# Patient Record
Sex: MALE | Race: BLACK OR AFRICAN AMERICAN | Employment: FULL TIME | URBAN - METROPOLITAN AREA
[De-identification: names, ages, dates, MRNs, and addresses within clinical notes are randomized per-mention and may not be internally consistent; named-entity substitution may affect disease eponyms.]

---
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
## 2021-12-29 ENCOUNTER — HOSPITAL ENCOUNTER (EMERGENCY)
Age: 21
Discharge: HOME | End: 2021-12-29
Attending: EMERGENCY MEDICINE

## 2021-12-29 ENCOUNTER — APPOINTMENT (OUTPATIENT)
Dept: GENERAL RADIOLOGY | Age: 21
End: 2021-12-29
Attending: EMERGENCY MEDICINE

## 2021-12-29 VITALS
TEMPERATURE: 97.7 F | SYSTOLIC BLOOD PRESSURE: 128 MMHG | RESPIRATION RATE: 18 BRPM | DIASTOLIC BLOOD PRESSURE: 78 MMHG | OXYGEN SATURATION: 98 % | HEART RATE: 56 BPM

## 2021-12-29 DIAGNOSIS — S63.289A DISLOCATION OF PROXIMAL INTERPHALANGEAL JOINT OF FINGER, INITIAL ENCOUNTER: Primary | ICD-10-CM

## 2021-12-29 PROCEDURE — 75810000301 HC ER LEVEL 1 CLOSED TREATMNT FRACTURE/DISLOCATION

## 2021-12-29 PROCEDURE — 99283 EMERGENCY DEPT VISIT LOW MDM: CPT

## 2021-12-29 PROCEDURE — 74011250637 HC RX REV CODE- 250/637: Performed by: EMERGENCY MEDICINE

## 2021-12-29 PROCEDURE — 73140 X-RAY EXAM OF FINGER(S): CPT

## 2021-12-29 PROCEDURE — 74011000250 HC RX REV CODE- 250: Performed by: EMERGENCY MEDICINE

## 2021-12-29 RX ORDER — HYDROCODONE BITARTRATE AND ACETAMINOPHEN 5; 325 MG/1; MG/1
1 TABLET ORAL ONCE
Status: COMPLETED | OUTPATIENT
Start: 2021-12-29 | End: 2021-12-29

## 2021-12-29 RX ORDER — IBUPROFEN 400 MG/1
800 TABLET ORAL
Status: COMPLETED | OUTPATIENT
Start: 2021-12-29 | End: 2021-12-29

## 2021-12-29 RX ORDER — LIDOCAINE HYDROCHLORIDE 20 MG/ML
10 INJECTION, SOLUTION INFILTRATION; PERINEURAL
Status: COMPLETED | OUTPATIENT
Start: 2021-12-29 | End: 2021-12-29

## 2021-12-29 RX ADMIN — HYDROCODONE BITARTRATE AND ACETAMINOPHEN 1 TABLET: 5; 325 TABLET ORAL at 19:06

## 2021-12-29 RX ADMIN — IBUPROFEN 800 MG: 400 TABLET, FILM COATED ORAL at 19:06

## 2021-12-29 RX ADMIN — LIDOCAINE HYDROCHLORIDE 200 MG: 20 INJECTION, SOLUTION INFILTRATION; PERINEURAL at 19:05

## 2021-12-29 NOTE — ED TRIAGE NOTES
Triage: Pt arrives ambulatory from home with CC of injury to his left pinky finger from playing basketball.

## 2021-12-29 NOTE — ED PROVIDER NOTES
The history is provided by the patient. Finger Pain   The current episode started less than 1 hour ago. The problem has not changed since onset. Pain location: left pinky. The pain is moderate. Associated symptoms include limited range of motion. The symptoms are aggravated by palpation and movement. He has tried rest for the symptoms. The treatment provided no relief. There has been a history of trauma (while playing basketball). No past medical history on file. No past surgical history on file. No family history on file. Social History     Socioeconomic History    Marital status: SINGLE     Spouse name: Not on file    Number of children: Not on file    Years of education: Not on file    Highest education level: Not on file   Occupational History    Not on file   Tobacco Use    Smoking status: Never Smoker    Smokeless tobacco: Never Used   Substance and Sexual Activity    Alcohol use: No    Drug use: No    Sexual activity: Never   Other Topics Concern    Not on file   Social History Narrative    Not on file     Social Determinants of Health     Financial Resource Strain:     Difficulty of Paying Living Expenses: Not on file   Food Insecurity:     Worried About Running Out of Food in the Last Year: Not on file    Chip of Food in the Last Year: Not on file   Transportation Needs:     Lack of Transportation (Medical): Not on file    Lack of Transportation (Non-Medical):  Not on file   Physical Activity:     Days of Exercise per Week: Not on file    Minutes of Exercise per Session: Not on file   Stress:     Feeling of Stress : Not on file   Social Connections:     Frequency of Communication with Friends and Family: Not on file    Frequency of Social Gatherings with Friends and Family: Not on file    Attends Yazidism Services: Not on file    Active Member of Clubs or Organizations: Not on file    Attends Club or Organization Meetings: Not on file    Marital Status: Not on file   Intimate Partner Violence:     Fear of Current or Ex-Partner: Not on file    Emotionally Abused: Not on file    Physically Abused: Not on file    Sexually Abused: Not on file   Housing Stability:     Unable to Pay for Housing in the Last Year: Not on file    Number of [place] in the Last Year: Not on file    Unstable Housing in the Last Year: Not on file         ALLERGIES: Patient has no known allergies. Review of Systems   Musculoskeletal: Positive for arthralgias and myalgias. All other systems reviewed and are negative. Vitals:    12/29/21 1741   BP: 138/79   Pulse: 89   Resp: 16   Temp: 98.3 °F (36.8 °C)   SpO2: 99%            Physical Exam  Vitals and nursing note reviewed. Constitutional:       Appearance: He is well-developed. HENT:      Head: Normocephalic and atraumatic. Eyes:      General: No scleral icterus. Cardiovascular:      Rate and Rhythm: Normal rate. Pulmonary:      Effort: Pulmonary effort is normal.   Abdominal:      General: There is no distension. Musculoskeletal:      Left hand: Deformity (left pinky) and tenderness present. Decreased range of motion. Normal capillary refill. Cervical back: Normal range of motion. Skin:     Findings: No erythema or rash. Neurological:      Mental Status: He is alert and oriented to person, place, and time. MDM       Reduction of Joint    Date/Time: 12/29/2021 7:28 PM  Performed by: Court Jc MD  Authorized by: Court Jc MD     Consent:     Consent obtained:  Verbal    Consent given by:  Patient    Risks discussed:  Pain  Injury:     Injury location:  Finger    Finger injury location:  L little finger    IP joint involved: yes    Pre-procedure assessment:     Neurological function: normal      Distal perfusion: normal      Range of motion: reduced    Anesthesia (see MAR for exact dosages):      Anesthesia method:  Nerve block    Block location:  Base of the left little finger    Block needle gauge:  25 G    Block anesthetic:  Lidocaine 2% w/o epi    Block technique:  2 injection digital block    Block injection procedure:  Anatomic landmarks identified, introduced needle, incremental injection, anatomic landmarks palpated and negative aspiration for blood    Block outcome:  Anesthesia achieved  Procedure details:     Manipulation performed: yes      Reduction successful: yes      X-ray confirmed reduction: yes      Immobilization:  Splint    Splint type:  Ulnar gutter  Post-procedure details:     Neurological function: normal      Distal perfusion: normal      Range of motion: normal      Patient tolerance of procedure: Tolerated well, no immediate complications    Splint, Ulnar Gutter    Date/Time: 12/30/2021 11:52 PM  Performed by: Naina Paulino RN  Authorized by: Davey Stover MD     Consent:     Consent obtained:  Verbal    Consent given by:  Patient  Pre-procedure details:     Sensation:  Normal  Procedure details:     Laterality:  Left    Location:  Arm    Arm:  L lower arm    Splint type:  Ulnar gutter  Post-procedure details:     Sensation:  Normal    Patient tolerance of procedure: Tolerated well, no immediate complications          Pt presents with an extremity injury with dislocation and small avulsion fracture. No evidence of other significant musculoskeletal injury. Patient was discharged home with a plan for pain control as well as instructions on managing his injuries and precautions for returning to the emergency department. No evidence of compartment syndrome on evaluation. Patient will be discharged home to follow-up with orthopedic surgery as instructed in discharge paperwork. Patient and family expressed understanding and agreed with plan.

## 2021-12-30 NOTE — ED NOTES
Per MD Holland Ivey, will apply modified ulnar gutter to include 4th and 5th digits and to extend slightly past wrist.